# Patient Record
Sex: MALE | Race: WHITE | NOT HISPANIC OR LATINO | Employment: FULL TIME | URBAN - METROPOLITAN AREA
[De-identification: names, ages, dates, MRNs, and addresses within clinical notes are randomized per-mention and may not be internally consistent; named-entity substitution may affect disease eponyms.]

---

## 2018-04-05 ENCOUNTER — OFFICE VISIT (OUTPATIENT)
Dept: URGENT CARE | Facility: CLINIC | Age: 33
End: 2018-04-05
Payer: COMMERCIAL

## 2018-04-05 VITALS
DIASTOLIC BLOOD PRESSURE: 70 MMHG | HEART RATE: 62 BPM | TEMPERATURE: 98.4 F | OXYGEN SATURATION: 95 % | SYSTOLIC BLOOD PRESSURE: 110 MMHG | HEIGHT: 71 IN | BODY MASS INDEX: 27.38 KG/M2 | WEIGHT: 195.55 LBS | RESPIRATION RATE: 16 BRPM

## 2018-04-05 DIAGNOSIS — J06.9 VIRAL UPPER RESPIRATORY TRACT INFECTION: ICD-10-CM

## 2018-04-05 PROCEDURE — 99203 OFFICE O/P NEW LOW 30 MIN: CPT | Performed by: INTERNAL MEDICINE

## 2018-04-05 RX ORDER — BENZONATATE 100 MG/1
100 CAPSULE ORAL 3 TIMES DAILY PRN
Qty: 60 CAP | Refills: 0 | Status: SHIPPED | OUTPATIENT
Start: 2018-04-05

## 2018-04-05 ASSESSMENT — ENCOUNTER SYMPTOMS
EYES NEGATIVE: 1
PSYCHIATRIC NEGATIVE: 1
BLOOD IN STOOL: 0
HEADACHES: 0
COUGH: 1
MYALGIAS: 0
CHILLS: 0
NAUSEA: 0
SPUTUM PRODUCTION: 0
HEMOPTYSIS: 0
SORE THROAT: 0
PALPITATIONS: 0
DIZZINESS: 0
WEIGHT LOSS: 0
SHORTNESS OF BREATH: 0
DIARRHEA: 0
VOMITING: 0
WHEEZING: 0
FEVER: 0

## 2018-04-05 NOTE — PROGRESS NOTES
Fred Pennington is a 32 y.o. male who presents for Sinus Problem (x  2 wks off / on, Nasal congestion, runny nose, headaches and sore throat off / on) and Cough (x 1.5 wk, dry cough)       Patient is a 32-year-old male who presents today with nasal congestion and dry cough for the last several weeks. Patient states that he had a upper respiratory Tract infection several weeks ago and still with the persistent cough. Patient denies any fever shakes or chills. No nausea vomiting or diarrhea. No rash. Patient's been taking over-the-counter      Review of Systems   Constitutional: Positive for malaise/fatigue. Negative for chills, fever and weight loss.   HENT: Positive for congestion. Negative for sore throat.    Eyes: Negative.    Respiratory: Positive for cough. Negative for hemoptysis, sputum production, shortness of breath and wheezing.    Cardiovascular: Negative for chest pain, palpitations and leg swelling.   Gastrointestinal: Negative for blood in stool, diarrhea, nausea and vomiting.   Genitourinary: Negative for dysuria.   Musculoskeletal: Negative for myalgias.   Skin: Negative for itching and rash.   Neurological: Negative for dizziness (negative headache) and headaches.   Psychiatric/Behavioral: Negative.      Allergies   Allergen Reactions   • Other Drug      pertusis vaccine     PMH:  has a past medical history of Diabetes (CMS-[facility]) (2003).  MEDS:   Current Outpatient Prescriptions:   •  benzonatate (TESSALON) 100 MG Cap, Take 1 Cap by mouth 3 times a day as needed for Cough., Disp: 60 Cap, Rfl: 0  •  insulin glargine (LANTUS) 100 UNIT/ML Solution, Inject 25 Units as instructed every evening., Disp: , Rfl:   •  INSULIN LISPRO, HUMAN, SC, Inject 15 Units as instructed every day. Indications: 15-25 units daily, Disp: , Rfl:   •  oxycodone-acetaminophen (PERCOCET) 5-325 MG Tab, Take 1-2 Tabs by mouth every four hours as needed., Disp: 60 Tab, Rfl: 0  •  promethazine (PHENERGAN) 25 MG Tab, Take 1 Tab  "by mouth every 6 hours as needed., Disp: 20 Tab, Rfl: 0  •  ibuprofen (MOTRIN) 200 MG Tab, Take 200 mg by mouth every 6 hours as needed., Disp: , Rfl:   ALLERGIES:   Allergies   Allergen Reactions   • Other Drug      pertusis vaccine     SURGHX:   Past Surgical History:   Procedure Laterality Date   • ACL RECONSTRUCTION SCOPE Right 1/6/2016    Procedure: ACL RECONSTRUCTION SCOPE WITH HAMSTRING AUTOGRAFT AND  ALLOGRAFT;  Surgeon: Dougie Martinez M.D.;  Location: SURGERY [facility];  Service:    • MEDIAL MENISCECTOMY Right 1/6/2016    Procedure: LATERAL MENISCECTOMY - PARTIAL ;  Surgeon: Dougie Martinez M.D.;  Location: SURGERY [facility];  Service:    • KNEE ARTHROSCOPY, ACL RECON W/ TIBIALIS TENDON AUTOGRAFT Left 2007   • SEPTOPLASTY  1998     SOCHX:  reports that he has never smoked. He has never used smokeless tobacco. He reports that he drinks about 0.6 oz of alcohol per week . He reports that he does not use drugs.  FH: family history is not on file.     Objective:   /70   Pulse 62   Temp 36.9 °C (98.4 °F)   Resp 16   Ht 1.803 m (5' 10.98\")   Wt 88.7 kg (195 lb 8.8 oz)   SpO2 95%   BMI 27.29 kg/m²   Physical Exam   Constitutional: He is oriented to person, place, and time. He appears well-developed and well-nourished. He is active. No distress.   HENT:   Head: Normocephalic and atraumatic.   Right Ear: External ear normal.   Left Ear: External ear normal.   Mouth/Throat: Oropharynx is clear and moist. No oropharyngeal exudate.   Eyes: Conjunctivae and EOM are normal. Pupils are equal, round, and reactive to light. Right eye exhibits no discharge. Left eye exhibits no discharge. No scleral icterus.   Neck: Normal range of motion. Neck supple. No JVD present. Carotid bruit is not present. No thyroid mass and no thyromegaly present.   Cardiovascular: Normal rate, regular rhythm, S1 normal, S2 normal and normal heart sounds.  Exam reveals no friction rub.    No murmur " heard.  Pulmonary/Chest: Effort normal and breath sounds normal. No respiratory distress. He has no wheezes. He has no rales.   Abdominal: Soft. Bowel sounds are normal. He exhibits no distension and no mass. There is no hepatosplenomegaly. There is no tenderness. There is no rebound and no guarding.   Musculoskeletal: Normal range of motion. He exhibits no edema.        Cervical back: Normal.   Lymphadenopathy:        Head (right side): No submental, no submandibular and no occipital adenopathy present.        Head (left side): No submental, no submandibular and no occipital adenopathy present.     He has no cervical adenopathy.   Neurological: He is alert and oriented to person, place, and time. He has normal strength. No cranial nerve deficit.   Skin: Skin is warm and dry. No rash noted. No erythema.   Psychiatric: He has a normal mood and affect. His behavior is normal. Thought content normal.        Assessment/Plan:   Assessment    1. Viral upper respiratory tract infection  - benzonatate (TESSALON) 100 MG Cap; Take 1 Cap by mouth 3 times a day as needed for Cough.  Dispense: 60 Cap; Refill: 0  Differential diagnosis, natural history, supportive care, and indications for immediate follow-up discussed.  DX:  Viral URI    TX: Otc flu and cold medications   PO fluids   Rest   Tylenol   Follow up with PCP   RTC or ED for any worsening symptoms

## 2018-07-18 ENCOUNTER — OFFICE VISIT (OUTPATIENT)
Dept: URGENT CARE | Facility: CLINIC | Age: 33
End: 2018-07-18
Payer: COMMERCIAL

## 2018-07-18 VITALS
BODY MASS INDEX: 27.44 KG/M2 | TEMPERATURE: 98.5 F | DIASTOLIC BLOOD PRESSURE: 84 MMHG | RESPIRATION RATE: 16 BRPM | WEIGHT: 196 LBS | HEART RATE: 97 BPM | OXYGEN SATURATION: 97 % | HEIGHT: 71 IN | SYSTOLIC BLOOD PRESSURE: 126 MMHG

## 2018-07-18 DIAGNOSIS — J06.9 URI WITH COUGH AND CONGESTION: ICD-10-CM

## 2018-07-18 DIAGNOSIS — J40 BRONCHITIS: Primary | ICD-10-CM

## 2018-07-18 PROCEDURE — 99214 OFFICE O/P EST MOD 30 MIN: CPT | Performed by: PHYSICIAN ASSISTANT

## 2018-07-18 RX ORDER — PREDNISONE 20 MG/1
TABLET ORAL
Qty: 23 TAB | Refills: 0 | Status: SHIPPED | OUTPATIENT
Start: 2018-07-18 | End: 2022-11-02

## 2018-07-18 RX ORDER — PROMETHAZINE HYDROCHLORIDE AND CODEINE PHOSPHATE 6.25; 1 MG/5ML; MG/5ML
5 SYRUP ORAL 4 TIMES DAILY PRN
Qty: 240 ML | Refills: 0 | Status: SHIPPED | OUTPATIENT
Start: 2018-07-18 | End: 2018-08-01

## 2018-07-18 RX ORDER — DOXYCYCLINE HYCLATE 100 MG
100 TABLET ORAL 2 TIMES DAILY
Qty: 14 TAB | Refills: 0 | Status: SHIPPED | OUTPATIENT
Start: 2018-07-18 | End: 2018-07-25

## 2018-07-18 NOTE — PATIENT INSTRUCTIONS
Acute Bronchitis, Adult  Acute bronchitis is when air tubes (bronchi) in the lungs suddenly get swollen. The condition can make it hard to breathe. It can also cause these symptoms:  · A cough.  · Coughing up clear, yellow, or green mucus.  · Wheezing.  · Chest congestion.  · Shortness of breath.  · A fever.  · Body aches.  · Chills.  · A sore throat.  Follow these instructions at home:  Medicines  · Take over-the-counter and prescription medicines only as told by your doctor.  · If you were prescribed an antibiotic medicine, take it as told by your doctor. Do not stop taking the antibiotic even if you start to feel better.  General instructions  · Rest.  · Drink enough fluids to keep your pee (urine) clear or pale yellow.  · Avoid smoking and secondhand smoke. If you smoke and you need help quitting, ask your doctor. Quitting will help your lungs heal faster.  · Use an inhaler, cool mist vaporizer, or humidifier as told by your doctor.  · Keep all follow-up visits as told by your doctor. This is important.  How is this prevented?  To lower your risk of getting this condition again:  · Wash your hands often with soap and water. If you cannot use soap and water, use hand .  · Avoid contact with people who have cold symptoms.  · Try not to touch your hands to your mouth, nose, or eyes.  · Make sure to get the flu shot every year.  Contact a doctor if:  · Your symptoms do not get better in 2 weeks.  Get help right away if:  · You cough up blood.  · You have chest pain.  · You have very bad shortness of breath.  · You become dehydrated.  · You faint (pass out) or keep feeling like you are going to pass out.  · You keep throwing up (vomiting).  · You have a very bad headache.  · Your fever or chills gets worse.  This information is not intended to replace advice given to you by your health care provider. Make sure you discuss any questions you have with your health care provider.  Document Released: 06/05/2009  Document Revised: 07/26/2017 Document Reviewed: 06/07/2017  [employer] Patient Education © 2017 [employer]

## 2018-07-18 NOTE — PROGRESS NOTES
Subjective:   Pt is a 33 y.o. male who presents with Cough and Pharyngitis            HPI  PT presents to  clinic today complaining of sore throat, intense cough, fatigue, runny nose, wheezing and SOB. PT denies CP, NVD, abdominal pain, joint pain. PT states these symptoms began around 3 weeks ago. PT states the pain is a 7/10 with coughing fits, aching in nature and worse at night.  Pt has not taken any RX medications for this condition. The pt's medication list, problem list, and allergies have been evaluated and reviewed during today's visit.    PMH:  Past Medical History:   Diagnosis Date   • Diabetes (HCC) 2003    Insulin dependent       PSH:  Past Surgical History:   Procedure Laterality Date   • ACL RECONSTRUCTION SCOPE Right 1/6/2016    Procedure: ACL RECONSTRUCTION SCOPE WITH HAMSTRING AUTOGRAFT AND  ALLOGRAFT;  Surgeon: Dougie Martinez M.D.;  Location: SURGERY [facility];  Service:    • MEDIAL MENISCECTOMY Right 1/6/2016    Procedure: LATERAL MENISCECTOMY - PARTIAL ;  Surgeon: Dougie Martinez M.D.;  Location: SURGERY [facility];  Service:    • KNEE ARTHROSCOPY, ACL RECON W/ TIBIALIS TENDON AUTOGRAFT Left 2007   • SEPTOPLASTY  1998       Fam Hx:  the patient's family history is not pertinent to their current complaint      Soc HX:  Social History     Social History   • Marital status:      Spouse name: N/A   • Number of children: N/A   • Years of education: N/A     Occupational History   • Not on file.     Social History Main Topics   • Smoking status: Never Smoker   • Smokeless tobacco: Never Used   • Alcohol use 0.6 oz/week     1 Standard drinks or equivalent per week   • Drug use: No   • Sexual activity: Not on file     Other Topics Concern   • Not on file     Social History Narrative   • No narrative on file         Medications:    Current Outpatient Prescriptions:   •  doxycycline (VIBRAMYCIN) 100 MG Tab, Take 1 Tab by mouth 2 times a day for 7 days., Disp: 14 Tab, Rfl: 0  •   predniSONE (DELTASONE) 20 MG Tab, Take 3 tabs at once PO daily x 5 days, then take 2 tabs at once daily x 3 days, then take 1 tab PO daily x 2 days, Disp: 23 Tab, Rfl: 0  •  promethazine-codeine (PHENERGAN-CODEINE) 6.25-10 MG/5ML Syrup, Take 5 mL by mouth 4 times a day as needed for up to 14 days., Disp: 240 mL, Rfl: 0  •  benzonatate (TESSALON) 100 MG Cap, Take 1 Cap by mouth 3 times a day as needed for Cough., Disp: 60 Cap, Rfl: 0  •  oxycodone-acetaminophen (PERCOCET) 5-325 MG Tab, Take 1-2 Tabs by mouth every four hours as needed., Disp: 60 Tab, Rfl: 0  •  promethazine (PHENERGAN) 25 MG Tab, Take 1 Tab by mouth every 6 hours as needed., Disp: 20 Tab, Rfl: 0  •  insulin glargine (LANTUS) 100 UNIT/ML Solution, Inject 25 Units as instructed every evening., Disp: , Rfl:   •  INSULIN LISPRO, HUMAN, SC, Inject 15 Units as instructed every day. Indications: 15-25 units daily, Disp: , Rfl:   •  ibuprofen (MOTRIN) 200 MG Tab, Take 200 mg by mouth every 6 hours as needed., Disp: , Rfl:       Allergies:  Other drug    ROS  Review of Systems   Constitutional: Positive for malaise/fatigue. Negative for fever and diaphoresis.   HENT: Positive for congestion, ear pain and sore throat. Negative for ear discharge, hearing loss, nosebleeds and tinnitus.    Eyes: Negative for blurred vision, double vision and photophobia.   Respiratory: Positive for cough, sputum production, shortness of breath and wheezing. Negative for hemoptysis.    Cardiovascular: Negative for chest pain and palpitations.   Gastrointestinal: Negative for nausea, vomiting, abdominal pain, diarrhea and constipation.   Genitourinary: Negative for dysuria and flank pain.   Musculoskeletal: Negative for joint pain and myalgias.   Skin: Negative for itching and rash.   Neurological: Positive for headaches. Negative for dizziness, tingling and weakness.   Endo/Heme/Allergies: Does not bruise/bleed easily.   Psychiatric/Behavioral: Negative for depression. The patient  "is not nervous/anxious.           Objective:     /84   Pulse 97   Temp 36.9 °C (98.5 °F)   Resp 16   Ht 1.803 m (5' 11\")   Wt 88.9 kg (196 lb)   SpO2 97%   BMI 27.34 kg/m²      Physical Exam      Physical Exam   Constitutional: PT is oriented to person, place, and time. PT appears well-developed and well-nourished. No distress.   HENT:   Head: Normocephalic and atraumatic.   Right Ear: Hearing, tympanic membrane, external ear and ear canal normal.   Left Ear: Hearing, tympanic membrane, external ear and ear canal normal.   Nose: Mucosal edema, rhinorrhea and sinus tenderness present. Right sinus exhibits frontal sinus tenderness. Left sinus exhibits frontal sinus tenderness.   Mouth/Throat: Uvula is midline. Mucous membranes are pale. Posterior oropharyngeal edema and posterior oropharyngeal erythema present. No oropharyngeal exudate.   Eyes: Conjunctivae normal and EOM are normal. Pupils are equal, round, and reactive to light. Right eye exhibits no discharge. Left eye exhibits no discharge.   Neck: Normal range of motion. Neck supple. No thyromegaly present.   Cardiovascular: Normal rate, regular rhythm, normal heart sounds and intact distal pulses.  Exam reveals no gallop and no friction rub.    No murmur heard.  Pulmonary/Chest: Effort normal. No respiratory distress. PT has wheezes. PT has no rales. PT exhibits tenderness.   Abdominal: Soft. Bowel sounds are normal. PT exhibits no distension and no mass. There is no tenderness. There is no rebound and no guarding.   Musculoskeletal: Normal range of motion. PT exhibits no edema and no tenderness.   Lymphadenopathy:     PT has no cervical adenopathy.   Neurological: Pt is alert and oriented to person, place, and time. Pt has normal reflexes. No cranial nerve deficit.   Skin: Skin is warm and dry. No rash noted. No erythema.   Psychiatric: PT has a normal mood and affect. Pt behavior is normal. Judgment and thought content normal.        "   Assessment/Plan:     1. Bronchitis    - doxycycline (VIBRAMYCIN) 100 MG Tab; Take 1 Tab by mouth 2 times a day for 7 days.  Dispense: 14 Tab; Refill: 0  - predniSONE (DELTASONE) 20 MG Tab; Take 3 tabs at once PO daily x 5 days, then take 2 tabs at once daily x 3 days, then take 1 tab PO daily x 2 days  Dispense: 23 Tab; Refill: 0    2. URI with cough and congestion    - predniSONE (DELTASONE) 20 MG Tab; Take 3 tabs at once PO daily x 5 days, then take 2 tabs at once daily x 3 days, then take 1 tab PO daily x 2 days  Dispense: 23 Tab; Refill: 0  - promethazine-codeine (PHENERGAN-CODEINE) 6.25-10 MG/5ML Syrup; Take 5 mL by mouth 4 times a day as needed for up to 14 days.  Dispense: 240 mL; Refill: 0    Rest, fluids encouraged.  OTC decongestant for congestion/cough  AVS with medical info given.  Pt was in full understanding and agreement with the plan.  Follow-up as needed if symptoms worsen or fail to improve.

## 2022-11-02 ENCOUNTER — OFFICE VISIT (OUTPATIENT)
Dept: URGENT CARE | Facility: CLINIC | Age: 37
End: 2022-11-02

## 2022-11-02 VITALS
BODY MASS INDEX: 29.68 KG/M2 | WEIGHT: 212 LBS | DIASTOLIC BLOOD PRESSURE: 80 MMHG | HEIGHT: 71 IN | OXYGEN SATURATION: 96 % | SYSTOLIC BLOOD PRESSURE: 120 MMHG | HEART RATE: 108 BPM | TEMPERATURE: 98.2 F | RESPIRATION RATE: 16 BRPM

## 2022-11-02 DIAGNOSIS — B96.89 ACUTE BACTERIAL SINUSITIS: ICD-10-CM

## 2022-11-02 DIAGNOSIS — R05.9 COUGH, UNSPECIFIED TYPE: ICD-10-CM

## 2022-11-02 DIAGNOSIS — J01.90 ACUTE BACTERIAL SINUSITIS: ICD-10-CM

## 2022-11-02 LAB
EXTERNAL QUALITY CONTROL: NORMAL
INT CON NEG: NORMAL
INT CON POS: NORMAL
SARS-COV+SARS-COV-2 AG RESP QL IA.RAPID: NEGATIVE

## 2022-11-02 PROCEDURE — 87426 SARSCOV CORONAVIRUS AG IA: CPT | Performed by: NURSE PRACTITIONER

## 2022-11-02 PROCEDURE — 99203 OFFICE O/P NEW LOW 30 MIN: CPT | Performed by: NURSE PRACTITIONER

## 2022-11-02 RX ORDER — AMOXICILLIN AND CLAVULANATE POTASSIUM 875; 125 MG/1; MG/1
1 TABLET, FILM COATED ORAL 2 TIMES DAILY
Qty: 14 TABLET | Refills: 0 | Status: SHIPPED | OUTPATIENT
Start: 2022-11-02 | End: 2022-11-09

## 2022-11-02 ASSESSMENT — ENCOUNTER SYMPTOMS
DIARRHEA: 0
MYALGIAS: 1
WHEEZING: 0
SPUTUM PRODUCTION: 1
SORE THROAT: 1
HEADACHES: 1
CHILLS: 0
SHORTNESS OF BREATH: 0
ORTHOPNEA: 0
EYE DISCHARGE: 0
FEVER: 0
COUGH: 1
NAUSEA: 0

## 2022-11-02 NOTE — PROGRESS NOTES
Subjective     Fred Pennington is a 37 y.o. male who presents with Cough (X 5 weeks, runny nose, sore throat, ear pain headaches, states it comes and goes)            HPI  New problem.  Patient is a 37-year-old male who presents with symptoms of cough, congestion, runny nose, sore throat, headaches and body aches on and off for the past 5 weeks.  Today he reports continued congestion, cough, headaches and body aches.  He denies fever or chills.  He denies nausea or diarrhea.  He has been taking over-the-counter allergy medications for his symptoms with no relief.    Other drug  Current Outpatient Medications on File Prior to Visit   Medication Sig Dispense Refill    benzonatate (TESSALON) 100 MG Cap Take 1 Cap by mouth 3 times a day as needed for Cough. 60 Cap 0    insulin glargine (LANTUS) 100 UNIT/ML Solution Inject 25 Units as instructed every evening.      INSULIN LISPRO, HUMAN, SC Inject 15 Units as instructed every day. Indications: 15-25 units daily      ibuprofen (MOTRIN) 200 MG Tab Take 200 mg by mouth every 6 hours as needed.       No current facility-administered medications on file prior to visit.     Social History     Socioeconomic History    Marital status:      Spouse name: Not on file    Number of children: Not on file    Years of education: Not on file    Highest education level: Not on file   Occupational History    Not on file   Tobacco Use    Smoking status: Never    Smokeless tobacco: Never   Substance and Sexual Activity    Alcohol use: Yes     Alcohol/week: 0.6 oz     Types: 1 Standard drinks or equivalent per week    Drug use: No    Sexual activity: Not on file   Other Topics Concern    Not on file   Social History Narrative    Not on file     Social Determinants of Health     Financial Resource Strain: Not on file   Food Insecurity: Not on file   Transportation Needs: Not on file   Physical Activity: Not on file   Stress: Not on file   Social Connections: Not on file   Intimate  "Partner Violence: Not on file   Housing Stability: Not on file     Breast Cancer-related family history is not on file.      Review of Systems   Constitutional:  Positive for malaise/fatigue. Negative for chills and fever.   HENT:  Positive for congestion and sore throat.    Eyes:  Negative for discharge.   Respiratory:  Positive for cough and sputum production. Negative for shortness of breath and wheezing.    Cardiovascular:  Negative for chest pain and orthopnea.   Gastrointestinal:  Negative for diarrhea and nausea.   Musculoskeletal:  Positive for myalgias.   Neurological:  Positive for headaches.   Endo/Heme/Allergies:  Negative for environmental allergies.   All other systems reviewed and are negative.           Objective     /80   Pulse (!) 108   Temp 36.8 °C (98.2 °F) (Temporal)   Resp 16   Ht 1.803 m (5' 11\")   Wt 96.2 kg (212 lb)   SpO2 96%   BMI 29.57 kg/m²      Physical Exam  Constitutional:       Appearance: Normal appearance.   HENT:      Right Ear: Tympanic membrane normal.      Left Ear: Tympanic membrane normal.      Nose: Congestion present.      Mouth/Throat:      Mouth: Mucous membranes are moist.   Eyes:      Conjunctiva/sclera: Conjunctivae normal.   Cardiovascular:      Rate and Rhythm: Normal rate and regular rhythm.      Heart sounds: No murmur heard.  Pulmonary:      Effort: Pulmonary effort is normal.      Breath sounds: Normal breath sounds. No wheezing or rales.   Musculoskeletal:         General: Normal range of motion.   Skin:     General: Skin is warm and dry.   Neurological:      General: No focal deficit present.      Mental Status: He is alert and oriented to person, place, and time.   Psychiatric:         Mood and Affect: Mood normal.         Behavior: Behavior normal.                           Assessment & Plan        1. Acute bacterial sinusitis  amoxicillin-clavulanate (AUGMENTIN) 875-125 MG Tab      2. Cough, unspecified type  POCT SARS-COV Antigen [place] " (Symptomatic only)        Covid negative.  Since he has been ill for 5 weeks on and off, will place on augmentin.  May continue Otc medications as directed.  Differential diagnosis, natural history, supportive care, and indications for immediate follow-up discussed at length.

## 2023-01-22 ENCOUNTER — HOSPITAL ENCOUNTER (EMERGENCY)
Facility: MEDICAL CENTER | Age: 38
End: 2023-01-23
Attending: EMERGENCY MEDICINE

## 2023-01-22 DIAGNOSIS — L03.115 CELLULITIS OF RIGHT LOWER EXTREMITY: ICD-10-CM

## 2023-01-23 VITALS
TEMPERATURE: 98 F | WEIGHT: 214.73 LBS | RESPIRATION RATE: 18 BRPM | HEART RATE: 100 BPM | BODY MASS INDEX: 30.06 KG/M2 | HEIGHT: 71 IN | OXYGEN SATURATION: 98 % | DIASTOLIC BLOOD PRESSURE: 87 MMHG | SYSTOLIC BLOOD PRESSURE: 142 MMHG

## 2023-01-23 PROCEDURE — 700102 HCHG RX REV CODE 250 W/ 637 OVERRIDE(OP): Performed by: EMERGENCY MEDICINE

## 2023-01-23 PROCEDURE — 99283 EMERGENCY DEPT VISIT LOW MDM: CPT

## 2023-01-23 PROCEDURE — A9270 NON-COVERED ITEM OR SERVICE: HCPCS | Performed by: EMERGENCY MEDICINE

## 2023-01-23 RX ORDER — CEPHALEXIN 500 MG/1
500 CAPSULE ORAL 4 TIMES DAILY
Qty: 20 CAPSULE | Refills: 0 | Status: SHIPPED | OUTPATIENT
Start: 2023-01-23 | End: 2023-01-28

## 2023-01-23 RX ORDER — CEPHALEXIN 250 MG/1
500 CAPSULE ORAL ONCE
Status: COMPLETED | OUTPATIENT
Start: 2023-01-23 | End: 2023-01-23

## 2023-01-23 RX ADMIN — CEPHALEXIN 500 MG: 250 CAPSULE ORAL at 00:52

## 2023-01-23 ASSESSMENT — PAIN DESCRIPTION - PAIN TYPE: TYPE: ACUTE PAIN

## 2023-01-23 NOTE — ED NOTES
Pt medicated per mar.     Patient is stable for discharge at this time, anticipatory guidance provided, close follow-up is encouraged, and ED return instructions have been detailed. Patient and family are both agreeable to the disposition and plan and discharged home in ambulatory state and in good condition.     Rx education provided, Pt verbalized understanding;

## 2023-01-23 NOTE — ED PROVIDER NOTES
ED Provider Note        CHIEF COMPLAINT  Chief Complaint   Patient presents with    Knee Swelling    Knee Pain    Chills     Pt reports R knee swelling and pain.   Pt reports popping what looked like a pimple on his R knee about 2 hours ago.  Pt took 3 ibuprofen and used heat pack on his knee, he reports chills and felt like he had a fever. Also reports knee is hot to touch.  Pt hx type 1 diabetes.          HPI  LIMITATION TO HISTORY   Select: : None      Fred Pennington is a 37 y.o. male who presents to the Emergency Department with pain and swelling and redness to his right knee.  He reports that over the past few days he noticed some redness and irritation to his knee particularly with kneeling.  He noticed a small raised portion when she tried to pop but expressed some clear fluid, no pus.  Several hours after this he noted marked swelling over his right knee.  He reports he has been able to walk on it but does have pain when he flexes his knee.  He has used hot compresses which have improved the swelling.  He does have a history of type 1 diabetes, and his blood sugars today have been running in the 140s range.  He reports some subjective chills but denies measuring any fevers.    REVIEW OF SYSTEMS  See HPI for further details. All other systems are negative.     PAST MEDICAL HISTORY     Past Medical History:   Diagnosis Date    Diabetes ([facility]) 2003    Insulin dependent       SURGICAL HISTORY  Past Surgical History:   Procedure Laterality Date    RECONSTRUCTION, KNEE, ACL, ARTHROSCOPIC Right 1/6/2016    Procedure: ACL RECONSTRUCTION SCOPE WITH HAMSTRING AUTOGRAFT AND  ALLOGRAFT;  Surgeon: Dougie Martinez M.D.;  Location: SURGERY [facility];  Service:     MENISCECTOMY, KNEE, MEDIAL Right 1/6/2016    Procedure: LATERAL MENISCECTOMY - PARTIAL ;  Surgeon: Dougie Martinez M.D.;  Location: [facility];  Service:     RECONSTRUCTION, KNEE, ACL, ARTHROSCOPIC, USING TIBIALIS TENDON AUTOGRAFT Left  "2007    SEPTOPLASTY  1998       FAMILY HISTORY  History reviewed. No pertinent family history.    SOCIAL HISTORY    reports that he has never smoked. He has never used smokeless tobacco. He reports current alcohol use of about 0.6 oz per week. He reports that he does not use drugs.    CURRENT MEDICATIONS  Home Medications       Reviewed by Zoie Siu R.N. (Registered Nurse) on 01/23/23 at 0006  Med List Status: Partial     Medication Last Dose Status   benzonatate (TESSALON) 100 MG Cap  Active   ibuprofen (MOTRIN) 200 MG Tab  Active   insulin glargine (LANTUS) 100 UNIT/ML Solution  Active   INSULIN LISPRO, HUMAN, SC  Active                    ALLERGIES  Allergies   Allergen Reactions    Other Drug      pertusis vaccine       PHYSICAL EXAM  VITAL SIGNS: BP (!) 142/87   Pulse 100   Temp 36.7 °C (98 °F) (Temporal)   Resp 18   Ht 1.803 m (5' 11\")   Wt 97.4 kg (214 lb 11.7 oz)   SpO2 98%   BMI 29.95 kg/m²   Gen: Alert, no acute distress  HEENT: ATNC  Eyes: PERRL, EOMI, normal conjunctiva  Neck: trachea midline  Resp: no respiratory distress  CV: No JVD  Abd: non-distended  Ext: No deformities.  Erythema and swelling over right patella.  No bony tenderness.  No pain with active range of motion.  No pedal edema.  No calf tenderness.  Psych: normal mood  Neuro: speech fluent    DIAGNOSTIC STUDIES / PROCEDURES        COURSE & MEDICAL DECISION MAKING  Pertinent Labs & Imaging studies were reviewed. (See chart for details)      INITIAL ASSESSMENT AND PLAN  Care Narrative: Patient who is diabetic presents with redness, swelling over the right knee.  He is able to range the knee, but no evidence for septic arthritis.  Given the skin defect and overall clinical appearance, he appears to have cellulitis.  No evidence for necrotizing soft tissue infection.  He demonstrates no stigmata that would suggest DVT/PE.  No indication for vascular ultrasound.  A bedside ultrasound was performed over the area affected.  " My interpretation is: No abscess, thickening of the subcutaneous tissue and cobblestoning consistent with cellulitis.  Patient will be started on cephalexin for nonpurulent skin and soft tissue infection.  He was given his initial dose in the emergency department.  He was counseled on return precautions, anticipatory guidance.  I do not believe the patient warrants blood work as he clinically does not appear to be toxic.      Escalation of care considered, and ultimately not performed: diagnostic imaging.     Problem list: 1.  Cellulitis: Antibiotics  2.  Insulin-dependent diabetes mellitus: Home blood sugar monitoring and insulin use.    The patient was given return precautions, anticipatory guidance, and the opportunity to ask questions prior to discharge.       FINAL IMPRESSION  1. Cellulitis of right lower extremity           DISPOSITION:  Patient will be discharged home in stable condition.    FOLLOW UP:  Kira Callaway M.D.  [address] [address]  [place] [id number] [phone]    Schedule an appointment as soon as possible for a visit   As needed    [facility], Emergency Dept  [address]  [place] [address]  [phone]    If symptoms worsen      OUTPATIENT MEDICATIONS:  Discharge Medication List as of 1/23/2023 12:39 AM        START taking these medications    Details   cephALEXin (KEFLEX) 500 MG Cap Take 1 Capsule by mouth 4 times a day for 5 days., Disp-20 Capsule, R-0, Normal

## 2023-01-23 NOTE — ED TRIAGE NOTES
"37 yr old male walked from triage to room.    Chief Complaint   Patient presents with    Knee Swelling    Knee Pain    Chills     Pt reports R knee swelling and pain.   Pt reports popping what looked like a pimple on his R knee about 2 hours ago.  Pt took 3 ibuprofen and used heat pack on his knee, he reports chills and felt like he had a fever. Also reports knee is hot to touch.  Pt hx type 1 diabetes.      /87   Pulse (!) 105   Temp 37.2 °C (98.9 °F) (Temporal)   Resp 18   Ht 1.803 m (5' 11\")   Wt 97.4 kg (214 lb 11.7 oz)   SpO2 95%   BMI 29.95 kg/m²     "

## 2023-01-24 ENCOUNTER — HOSPITAL ENCOUNTER (EMERGENCY)
Facility: MEDICAL CENTER | Age: 38
End: 2023-01-24
Attending: EMERGENCY MEDICINE
Payer: COMMERCIAL

## 2023-01-24 VITALS
SYSTOLIC BLOOD PRESSURE: 121 MMHG | OXYGEN SATURATION: 95 % | HEART RATE: 111 BPM | BODY MASS INDEX: 29.95 KG/M2 | WEIGHT: 214.73 LBS | RESPIRATION RATE: 18 BRPM | DIASTOLIC BLOOD PRESSURE: 70 MMHG

## 2023-01-24 DIAGNOSIS — L03.115 CELLULITIS OF RIGHT KNEE: ICD-10-CM

## 2023-01-24 PROCEDURE — 700102 HCHG RX REV CODE 250 W/ 637 OVERRIDE(OP): Performed by: EMERGENCY MEDICINE

## 2023-01-24 PROCEDURE — 99284 EMERGENCY DEPT VISIT MOD MDM: CPT

## 2023-01-24 PROCEDURE — A9270 NON-COVERED ITEM OR SERVICE: HCPCS | Performed by: EMERGENCY MEDICINE

## 2023-01-24 RX ORDER — SULFAMETHOXAZOLE AND TRIMETHOPRIM 800; 160 MG/1; MG/1
2 TABLET ORAL 2 TIMES DAILY
Qty: 40 TABLET | Refills: 0 | Status: SHIPPED | OUTPATIENT
Start: 2023-01-24 | End: 2023-01-24 | Stop reason: SDUPTHER

## 2023-01-24 RX ORDER — SULFAMETHOXAZOLE AND TRIMETHOPRIM 800; 160 MG/1; MG/1
2 TABLET ORAL 2 TIMES DAILY
Qty: 40 TABLET | Refills: 0 | Status: SHIPPED | OUTPATIENT
Start: 2023-01-24 | End: 2023-02-03

## 2023-01-24 RX ORDER — SULFAMETHOXAZOLE AND TRIMETHOPRIM 800; 160 MG/1; MG/1
2 TABLET ORAL ONCE
Status: COMPLETED | OUTPATIENT
Start: 2023-01-24 | End: 2023-01-24

## 2023-01-24 RX ADMIN — SULFAMETHOXAZOLE AND TRIMETHOPRIM 2 TABLET: 800; 160 TABLET ORAL at 12:51

## 2023-01-24 NOTE — ED PROVIDER NOTES
ER Provider Note    Scribed for Nikolai Walter M.d. by Bobby Akbar. 1/24/2023  12:18 PM    Primary Care Provider: Kira Callaway M.D.    CHIEF COMPLAINT  Chief Complaint   Patient presents with    Knee Pain     R side knee pain r/t previous wound, c/o redness, hot to touch, fevers, swelling      EXTERNAL RECORDS REVIEWED  Other None pertinent to today's visit    HPI/ROS  LIMITATION TO HISTORY   Select: : None  OUTSIDE HISTORIAN(S):  Father    Fred Pennington is a 37 y.o. male who presents to the ED complaining of right sided knee pain. He states he was seen two days ago after popping what he thought was a pimple on his right knee and developing a red area around where he popped it. The red area has more than doubled in size prompting him to present for medical evaluation. He has a history of Diabetes Mellitus. He states he's been checking his sugars which are running high, but he states he's doing what he can to keep his sugars down. He complains of associated fevers and chills, but denies any nausea or vomiting. He denies any known allergies. He adds that he takes insulin daily     PAST MEDICAL HISTORY  Past Medical History:   Diagnosis Date    Diabetes ([facility]) 2003    Insulin dependent     SURGICAL HISTORY  Past Surgical History:   Procedure Laterality Date    RECONSTRUCTION, KNEE, ACL, ARTHROSCOPIC Right 1/6/2016    Procedure: ACL RECONSTRUCTION SCOPE WITH HAMSTRING AUTOGRAFT AND  ALLOGRAFT;  Surgeon: Dougie Martinez M.D.;  Location: SURGERY [facility];  Service:     MENISCECTOMY, KNEE, MEDIAL Right 1/6/2016    Procedure: LATERAL MENISCECTOMY - PARTIAL ;  Surgeon: Dougie Martinez M.D.;  Location: SURGERY [facility];  Service:     RECONSTRUCTION, KNEE, ACL, ARTHROSCOPIC, USING TIBIALIS TENDON AUTOGRAFT Left 2007    SEPTOPLASTY  1998     FAMILY HISTORY  History reviewed. No pertinent family history.    SOCIAL HISTORY   reports that he has never smoked. He has never used smokeless  tobacco. He reports current alcohol use of about 0.6 oz per week. He reports that he does not use drugs.    CURRENT MEDICATIONS  Previous Medications    BENZONATATE (TESSALON) 100 MG CAP    Take 1 Cap by mouth 3 times a day as needed for Cough.    CEPHALEXIN (KEFLEX) 500 MG CAP    Take 1 Capsule by mouth 4 times a day for 5 days.    IBUPROFEN (MOTRIN) 200 MG TAB    Take 200 mg by mouth every 6 hours as needed.    INSULIN GLARGINE (LANTUS) 100 UNIT/ML SOLUTION    Inject 25 Units as instructed every evening.    INSULIN LISPRO, HUMAN, SC    Inject 15 Units as instructed every day. Indications: 15-25 units daily     ALLERGIES  Other drug    PHYSICAL EXAM  /70   Pulse (!) 111   Resp 18   Wt 97.4 kg (214 lb 11.7 oz)   SpO2 95%   BMI 29.95 kg/m²   Nursing note and vitals reviewed.  Constitutional: Well-developed and well-nourished. No distress.   HENT: Head is normocephalic and atraumatic. Oropharynx is clear and moist without exudate or erythema.   Eyes: Pupils are equal, round, and reactive to light. Conjunctiva are normal.   Cardiovascular: Normal rate and regular rhythm. No murmur heard. Normal radial pulses.  Pulmonary/Chest: Breath sounds normal. No wheezes or rales.   Abdominal: Soft and non-tender. No distention    Musculoskeletal: 8x10 cm of erythema over right patella, no knee effusion or abscess. Extremities exhibit normal range of motion.  Neurological: Awake, alert and oriented to person, place, and time. No focal deficits noted.  Skin: Skin is warm and dry. No rash.   Psychiatric: Normal mood and affect. Appropriate for clinical situation    COURSE & MEDICAL DECISION MAKING     ED Observation Status? No; Patient does not meet criteria for ED Observation.     INITIAL ASSESSMENT AND PLAN    Differential diagnoses include but not limited to: Cellulitis    12:21 PM - Patient seen and examined at bedside. Discussed plan of care, including adding another antibiotic (Bactrim DS) to the patient's current  regimen. I also advised to use a hot compress, but to avoid ice due to the constriction of blood vessels. I explained that he should have an alleviation of symptoms, and if they do not improve to follow up with PCP, or return to the ED for further evaluation. Patient agrees to the plan of care.     Patient returns the emergency department with worsening infection.  I feel that he likely has an MRSA infection.  Will treat with Bactrim.  First dose provided in the emergency department.  Patient's father is concerned and we all agree and acknowledge the patient's high risk being type I diabetic.  Is been watching his blood sugars closely.  He will continue to do so.  Return precautions discussed.  However, given his clinical presentation I do feel that a trial of outpatient therapy with coverage for MRSA is appropriate.  Strict return precautions and low threshold to return discussed.  Acknowledged by family and patient.    ADDITIONAL PROBLEM LIST AND DISPOSITION  Decision tools and prescription drugs considered including, but not limited to: Antibiotics Bactrim DS .    Patient will be discharged home.    FOLLOW UP:  Kira Callaway M.D.  [address] [address] [phone]    In 2 days      [facility], Emergency Dept  [address]  [facility] [address] [phone]    If symptoms worsen      OUTPATIENT MEDICATIONS:  New Prescriptions    SULFAMETHOXAZOLE-TRIMETHOPRIM (BACTRIM DS) 800-160 MG TABLET    Take 2 Tablets by mouth 2 times a day for 10 days.     FINAL DIAGNOSIS  1. Cellulitis of right knee       Bobby WINTERS), am scribing for, and in the presence of, Nikolai Walter M.D..    Electronically signed by: Bobby Ceballos), 1/24/2023    Nikolai WINTERS M.D. personally performed the services described in this documentation, as scribed by Bobby Akbar in my presence, and it is both accurate and complete.    The note accurately reflects  work and decisions made by me.  Nikolai Walter M.D.  1/24/2023  1:17 PM

## 2023-01-24 NOTE — ED TRIAGE NOTES
Chief Complaint   Patient presents with   • Knee Pain     R side knee pain r/t previous wound, c/o redness, hot to touch, fevers, swelling      /70   Pulse (!) 111   Resp 18   Wt 97.4 kg (214 lb 11.7 oz)   SpO2 95%   BMI 29.95 kg/m²     Pt BIB family for above concern; pt was seen in this facility a few days prior for a wound on his R knee, US done and sent home on oral abx - pt states she is not improving

## 2023-01-24 NOTE — ED NOTES
Pt given d/c paperwork and RX p/u information, pt verbalized understanding all information given. Pt assisted out of the ER in w/c w/o difficulty w/ family